# Patient Record
Sex: MALE | Race: WHITE | NOT HISPANIC OR LATINO
[De-identification: names, ages, dates, MRNs, and addresses within clinical notes are randomized per-mention and may not be internally consistent; named-entity substitution may affect disease eponyms.]

---

## 2019-03-05 ENCOUNTER — APPOINTMENT (OUTPATIENT)
Dept: HEART AND VASCULAR | Facility: CLINIC | Age: 59
End: 2019-03-05
Payer: COMMERCIAL

## 2019-03-05 ENCOUNTER — NON-APPOINTMENT (OUTPATIENT)
Age: 59
End: 2019-03-05

## 2019-03-05 VITALS
HEART RATE: 82 BPM | HEIGHT: 69 IN | SYSTOLIC BLOOD PRESSURE: 122 MMHG | WEIGHT: 184 LBS | BODY MASS INDEX: 27.25 KG/M2 | DIASTOLIC BLOOD PRESSURE: 80 MMHG

## 2019-03-05 PROCEDURE — 93000 ELECTROCARDIOGRAM COMPLETE: CPT

## 2019-03-05 PROCEDURE — 93306 TTE W/DOPPLER COMPLETE: CPT

## 2019-03-05 PROCEDURE — 99215 OFFICE O/P EST HI 40 MIN: CPT | Mod: 25

## 2019-03-05 RX ORDER — ATORVASTATIN CALCIUM 20 MG/1
20 TABLET, FILM COATED ORAL
Refills: 0 | Status: ACTIVE | COMMUNITY

## 2019-03-05 NOTE — PHYSICAL EXAM
[General Appearance - Well Developed] : well developed [Normal Appearance] : normal appearance [Well Groomed] : well groomed [General Appearance - Well Nourished] : well nourished [No Deformities] : no deformities [General Appearance - In No Acute Distress] : no acute distress [Normal Oral Mucosa] : normal oral mucosa [No Oral Pallor] : no oral pallor [No Oral Cyanosis] : no oral cyanosis [Normal Jugular Venous A Waves Present] : normal jugular venous A waves present [Normal Jugular Venous V Waves Present] : normal jugular venous V waves present [No Jugular Venous Sosa A Waves] : no jugular venous sosa A waves [Respiration, Rhythm And Depth] : normal respiratory rhythm and effort [Exaggerated Use Of Accessory Muscles For Inspiration] : no accessory muscle use [Auscultation Breath Sounds / Voice Sounds] : lungs were clear to auscultation bilaterally [Heart Rate And Rhythm] : heart rate and rhythm were normal [Heart Sounds] : normal S1 and S2 [Murmurs] : no murmurs present [Nail Clubbing] : no clubbing of the fingernails [Cyanosis, Localized] : no localized cyanosis [Petechial Hemorrhages (___cm)] : no petechial hemorrhages [] : no ischemic changes

## 2019-03-05 NOTE — REASON FOR VISIT
[FreeTextEntry1] : Homer Mckinney is a 59 yo man with a h/o drug eluting LAD stent in 2013 who has not seen me since 2015.\par \par The pt had moved to [place] and is now here for a check up.\par \par He has lost a tremendous amount of weight on the keto diet.  He is active but not formally exercising. He would like to start hiking. He denies cp/sob.\par \par He does have occasional lightheadedness when standing from a  squatting position - but no syncope.\par \par He had recent labs drawn showing LDL = 105.

## 2019-03-05 NOTE — ASSESSMENT
[FreeTextEntry1] : Homer Mckinney is a 57 yo man with a h/o drug eluting LAD stent in 2013 who has not seen me since 2015.\par \par The pt had moved to [place] and is now here for a check up.\par \par He has lost a tremendous amount of weight on the keto diet.  He is active but not formally exercising. He would like to start hiking. He denies cp/sob. He does have occasional lightheadedness when standing froma  squatting position - but no syncope.  His pressure is lower since losing weight but would not change meds at [facility] stime unless symptoms get worse.\par \par Echo shows normal LVF with mild MR.\par \par He had recent labs drawn showing LDL = 105.\par \par I have recommended increasing his atorvastatin to 40mg as his goal LDL is <70.  He would like to try diet and exercise.\par \par He will have a nuclear stress test.\par \par He will also have an MRA without contrast to evaluate aorta.

## 2019-03-13 ENCOUNTER — FORM ENCOUNTER (OUTPATIENT)
Age: 59
End: 2019-03-13

## 2019-03-14 ENCOUNTER — OUTPATIENT (OUTPATIENT)
Dept: OUTPATIENT SERVICES | Facility: HOSPITAL | Age: 59
LOS: 1 days | End: 2019-03-14
Payer: COMMERCIAL

## 2019-03-14 DIAGNOSIS — I25.10 ATHEROSCLEROTIC HEART DISEASE OF NATIVE CORONARY ARTERY WITHOUT ANGINA PECTORIS: ICD-10-CM

## 2019-03-14 PROCEDURE — 78452 HT MUSCLE IMAGE SPECT MULT: CPT | Mod: 26

## 2019-03-14 PROCEDURE — 93016 CV STRESS TEST SUPVJ ONLY: CPT

## 2019-03-14 PROCEDURE — A9502: CPT

## 2019-03-14 PROCEDURE — 93018 CV STRESS TEST I&R ONLY: CPT

## 2019-03-14 PROCEDURE — 78452 HT MUSCLE IMAGE SPECT MULT: CPT

## 2019-03-14 PROCEDURE — 93017 CV STRESS TEST TRACING ONLY: CPT
